# Patient Record
Sex: MALE | URBAN - METROPOLITAN AREA
[De-identification: names, ages, dates, MRNs, and addresses within clinical notes are randomized per-mention and may not be internally consistent; named-entity substitution may affect disease eponyms.]

---

## 2021-10-04 ENCOUNTER — TELEPHONE (OUTPATIENT)
Dept: ENDOCRINOLOGY | Age: 56
End: 2021-10-04

## 2021-10-04 NOTE — TELEPHONE ENCOUNTER
----- Message from Rosanna India sent at 10/4/2021  9:15 AM EDT -----  Regarding: Deniz Merida MD  General Message/Vendor Calls    Caller's first and last name:Manda Hughes       Reason for call:callback      Callback required yes/no and why:yes      Best contact number(s):[phone]      Details to clarify the request:the patients wife is requesting a callback regarding the patients status on when he can get an appointment for Dr. David Glez

## 2021-10-04 NOTE — TELEPHONE ENCOUNTER
Spoke with Rohan Smith Mr Lizandro Johnson wife and after she made me aware of her 's insurance, she was told that as of right now,   Dr Tristen White is only able to see pts that has straight Medicare, [employer] or [employer]. She was then encouraged to call the office towards the end of the month to see whether or not this coverage has changed. She voiced understanding and stated that she is having problems at Dr Alfredo Lauren previous office with renewing his medications. Rohan Smith was made aware that I would bring this to Dr Alfredo Lauren attention.